# Patient Record
Sex: FEMALE | Race: BLACK OR AFRICAN AMERICAN
[De-identification: names, ages, dates, MRNs, and addresses within clinical notes are randomized per-mention and may not be internally consistent; named-entity substitution may affect disease eponyms.]

---

## 2016-07-13 VITALS
SYSTOLIC BLOOD PRESSURE: 121 MMHG | SYSTOLIC BLOOD PRESSURE: 121 MMHG | DIASTOLIC BLOOD PRESSURE: 66 MMHG | SYSTOLIC BLOOD PRESSURE: 121 MMHG | DIASTOLIC BLOOD PRESSURE: 66 MMHG | DIASTOLIC BLOOD PRESSURE: 66 MMHG

## 2017-02-21 ENCOUNTER — HOSPITAL ENCOUNTER (OUTPATIENT)
Dept: HOSPITAL 61 - MAMMO | Age: 72
Discharge: HOME | End: 2017-02-21
Attending: SURGERY
Payer: MEDICARE

## 2017-02-21 DIAGNOSIS — D05.11: Primary | ICD-10-CM

## 2017-02-21 NOTE — RAD
DATE: 2/21/2017.



EXAM: DIGITAL DIAGNOSTIC RT.



HISTORY: Six-month follow-up after right breast conservation therapy for

ductal carcinoma in situ.



COMPARISON: 5/23/2016.



This study was interpreted with the benefit of Computerized Aided Detection

(CAD).



FINDINGS:



The breast parenchyma heterogeneously dense, which could reduce sensitivity of

mammography.



There are changes of breast conservation therapy superiorly on the right. This

results in a region of density or architectural distortion at the site of the

previously noted calcifications. Skin thickening and interstitial edema are

consistent with posttreatment change. No recurrent calcifications or masses

are appreciated More inferiorly, there are pre-existing vascular and coarse

calcifications that appear benign. There is no clearly suspicious finding.    





BI-RADS CATEGORY: 3 PROBABLY BENIGN FINDING(S)-SHORT INTERVAL FOLLOW-UP

SUGGESTED.



RECOMMENDED FOLLOW-UP: 6M 6 MONTH FOLLOW-UP.

Return for bilateral surveillance mammography in 6 months.



PQRS compliance statement: Patient information was entered into a reminder

system with a target due date 8/21/2017 for the next mammogram.



Mammography is a sensitive method for finding small breast cancers, but it

does not detect them all and is not a substitute for careful clinical

examination.  A negative mammogram does not negate a clinically suspicious

finding and should not result in delay in biopsying a clinically suspicious

abnormality.



"Our facility is accredited by the [employer] Mammography

Program."

## 2017-08-07 ENCOUNTER — HOSPITAL ENCOUNTER (OUTPATIENT)
Dept: HOSPITAL 61 - MAMMO | Age: 72
Discharge: HOME | End: 2017-08-07
Attending: SURGERY
Payer: COMMERCIAL

## 2017-08-07 DIAGNOSIS — D05.11: Primary | ICD-10-CM

## 2017-08-07 NOTE — RAD
DATE: 8/7/2017



EXAM: DIGITAL DIAGNOSTIC BILATERAL



HISTORY: Right breast cancer.



COMPARISON: 2/21/2017 and 5/23/2016



This study was interpreted with the benefit of Computerized Aided Detection

(CAD).







FINDINGS: Digital MLO and CC mammograms of both breasts were obtained. An

additional true lateral digital mammogram of the right breast was obtained.

Comparison studies are dated 2/21/2017 and 5/23/2016. 



The breast parenchyma is heterogeneous and dense which can obscure a lesion on

mammography (breast density code C). An area of scarring is again seen

involving the right breast, unchanged  . Benign-appearing calcifications are

seen scattered throughout both breasts. No dominant mass is seen. No malignant

appearing calcification is noted. No new area of architectural distortion is

seen.







IMPRESSION: BI-RADS Category 2 benign findings. There is no mammographic

evidence of malignancy. Routine yearly screening mammography is recommended

for follow-up.







BI-RADS CATEGORY: 2 BENIGN FINDING(S)



RECOMMENDED FOLLOW-UP: 12M 12 MONTH FOLLOW-UP



PQRS compliance statement: Patient information was entered into a reminder

system with a target due date 8/7/2018 for the next mammogram.



Mammography is a sensitive method for finding small breast cancers, but it

does not detect them all and is not a substitute for careful clinical

examination.  A negative mammogram does not negate a clinically suspicious

finding and should not result in delay in biopsying a clinically suspicious

abnormality.



"Our facility is accredited by the [employer] Mammography

Program."

## 2018-08-07 ENCOUNTER — HOSPITAL ENCOUNTER (OUTPATIENT)
Dept: HOSPITAL 61 - KCIC MAMMO | Age: 73
Discharge: HOME | End: 2018-08-07
Attending: INTERNAL MEDICINE
Payer: COMMERCIAL

## 2018-08-07 DIAGNOSIS — I50.9: ICD-10-CM

## 2018-08-07 DIAGNOSIS — E11.9: ICD-10-CM

## 2018-08-07 DIAGNOSIS — Z17.0: Primary | ICD-10-CM

## 2018-08-07 DIAGNOSIS — Z85.3: ICD-10-CM

## 2018-08-07 PROCEDURE — 77066 DX MAMMO INCL CAD BI: CPT

## 2018-09-19 ENCOUNTER — HOSPITAL ENCOUNTER (OUTPATIENT)
Dept: HOSPITAL 61 - KCIC DEXA | Age: 73
Discharge: HOME | End: 2018-09-19
Attending: INTERNAL MEDICINE
Payer: COMMERCIAL

## 2018-09-19 DIAGNOSIS — Z78.0: ICD-10-CM

## 2018-09-19 DIAGNOSIS — Z13.820: Primary | ICD-10-CM

## 2018-09-19 DIAGNOSIS — M19.90: ICD-10-CM

## 2018-09-19 DIAGNOSIS — Z90.710: ICD-10-CM

## 2018-09-19 DIAGNOSIS — E11.9: ICD-10-CM

## 2018-09-19 DIAGNOSIS — Z85.3: ICD-10-CM

## 2018-09-19 DIAGNOSIS — E78.00: ICD-10-CM

## 2018-09-19 PROCEDURE — 77080 DXA BONE DENSITY AXIAL: CPT

## 2018-09-19 NOTE — [facility]
EXAM: Dual energy x-ray absorptiometry (DEXA).

 

HISTORY: Postmenopausal female presents for osteoporosis screening.

 

COMPARISON: None.

 

TECHNIQUE: Dual energy x-ray absorptiometry of the lumbar spine and left 

was performed.  Calculation of bone mineral density based on standard 

deviations above or below the expected young adult normal value (T-score) 

was completed. 

 

FINDINGS: The average bone mineral density in the 1st through 4th lumbar 

vertebrae is 1.369 g/cmxcm, corresponding with a T-score of 2.9.

 

The average total bone mineral density in the left hip is 1.149 g/cmxcm, 

corresponding with a  T-score of 1.7.

 

 

IMPRESSION:   

 

Normal bone mineral density.

 

Note: Definitions established by the World Health Organization:

 

1. Normal: T-score is -1.0 or above.

2. Osteopenia: T-score is between -1.0 and -2.5 .

3. Osteoporosis: T-score is -2.5 or below. 

 

Electronically signed by: Karyna Reyes MD (9/19/2018 12:55 PM) [place] [id number]